# Patient Record
Sex: MALE | Race: BLACK OR AFRICAN AMERICAN | NOT HISPANIC OR LATINO | Employment: PART TIME | URBAN - METROPOLITAN AREA
[De-identification: names, ages, dates, MRNs, and addresses within clinical notes are randomized per-mention and may not be internally consistent; named-entity substitution may affect disease eponyms.]

---

## 2024-04-22 DIAGNOSIS — S62.306A CLOSED DISPLACED FRACTURE OF FIFTH METACARPAL BONE OF RIGHT HAND, UNSPECIFIED PORTION OF METACARPAL, INITIAL ENCOUNTER: Primary | ICD-10-CM

## 2024-05-08 ENCOUNTER — HOSPITAL ENCOUNTER (OUTPATIENT)
Dept: RADIOLOGY | Facility: HOSPITAL | Age: 24
Discharge: HOME | End: 2024-05-08
Attending: STUDENT IN AN ORGANIZED HEALTH CARE EDUCATION/TRAINING PROGRAM
Payer: MEDICAID

## 2024-05-08 ENCOUNTER — OFFICE VISIT (OUTPATIENT)
Dept: ORTHOPEDICS | Facility: CLINIC | Age: 24
End: 2024-05-08
Payer: MEDICAID

## 2024-05-08 VITALS
DIASTOLIC BLOOD PRESSURE: 72 MMHG | HEIGHT: 68 IN | BODY MASS INDEX: 22.61 KG/M2 | SYSTOLIC BLOOD PRESSURE: 115 MMHG | WEIGHT: 149.19 LBS | TEMPERATURE: 98 F | RESPIRATION RATE: 20 BRPM | HEART RATE: 70 BPM

## 2024-05-08 DIAGNOSIS — S62.306A CLOSED DISPLACED FRACTURE OF FIFTH METACARPAL BONE OF RIGHT HAND, UNSPECIFIED PORTION OF METACARPAL, INITIAL ENCOUNTER: ICD-10-CM

## 2024-05-08 DIAGNOSIS — S62.306A CLOSED DISPLACED FRACTURE OF FIFTH METACARPAL BONE OF RIGHT HAND, UNSPECIFIED PORTION OF METACARPAL, INITIAL ENCOUNTER: Primary | ICD-10-CM

## 2024-05-08 PROCEDURE — 73130 X-RAY EXAM OF HAND: CPT | Mod: TC,RT

## 2024-05-08 PROCEDURE — 99213 OFFICE O/P EST LOW 20 MIN: CPT | Mod: PBBFAC,25

## 2024-05-08 PROCEDURE — 3078F DIAST BP <80 MM HG: CPT | Mod: CPTII,,, | Performed by: SPECIALIST

## 2024-05-08 PROCEDURE — 99203 OFFICE O/P NEW LOW 30 MIN: CPT | Mod: S$PBB,,, | Performed by: SPECIALIST

## 2024-05-08 PROCEDURE — 1159F MED LIST DOCD IN RCRD: CPT | Mod: CPTII,,, | Performed by: SPECIALIST

## 2024-05-08 PROCEDURE — 3008F BODY MASS INDEX DOCD: CPT | Mod: CPTII,,, | Performed by: SPECIALIST

## 2024-05-08 PROCEDURE — 3074F SYST BP LT 130 MM HG: CPT | Mod: CPTII,,, | Performed by: SPECIALIST

## 2024-05-08 NOTE — PROGRESS NOTES
Faculty Attestation: Pernell Stewart  was seen at [facility] in the Orthopaedic Clinic. Patient chart reviewed. History of Present Illness, Physical Exam, and Assessment and Plan reviewed. Treatment plan is reasonable and appropriate. Compliance with treatment recommendations is important. No procedure was performed.     Shaheed Alexander MD  Orthopaedic Surgery

## 2024-05-08 NOTE — PROGRESS NOTES
[facility]  NEW Patient Office Visit  05/08/2024       Patient ID: Pernell Stewart  YOB: 2000  MRN: [id number]    Chief Complaint: Injury of the Right Hand (New Patient)      Orthopaedic Injuries:  Right 5th metacarpal shaft fracture    Orthopaedic Surgeries:     HPI:  Pernell Stewart is a 24 y.o. male with above injury he reports 4-5 days ago.  He was seen in urgent care.  Here today to establish.  He is right-hand dominant.  He does report he has a current smoker.  Range of motion is very reasonable.  No extensor lag, no rotational deformity.  He works as a stock room worker.     12 point ROS performed and negative except as above.     Past Medical History:    History reviewed. No pertinent past medical history.  Past Surgical History:   Procedure Laterality Date    ELBOW SURGERY      Hardware implanted and removed     No family history on file.  Social History     Socioeconomic History    Marital status: Single   Tobacco Use    Smoking status: Every Day     Types: Cigarettes, Vaping with nicotine    Smokeless tobacco: Never   Substance and Sexual Activity    Alcohol use: Yes    Drug use: Yes     Types: Marijuana       Review of patient's allergies indicates:  No Known Allergies    Physical Exam:    Right-hand   Tenderness to palpation 5th metacarpal shaft   Able to achieve full extension, make a full composite fist    Imaging independently interpreted:  X-ray right hand 5th metacarpal shaft fracture, interval callus formation    Assessment and Plan:    Pernell Stewart is a 24 y.o. male with 5th metacarpal shaft fracture, alignment is very reasonable.  Plan to treat nonoperatively.     -NWB RUE - questionable compliance as he reports he must work  -Velcro wrist brace given today   -Follow up 4 week  -PCP referral to establish, he is interested in smoking cessation medication    WB Status:NWB RUE   Follow up:4 weeks   XR/to-do next visit:XR R hand     Samina Negron  MD  [facility] PGY3          Orders Placed This Encounter    X-Ray Hand Complete Right    Ambulatory referral/consult to Family Practice